# Patient Record
Sex: MALE
[De-identification: names, ages, dates, MRNs, and addresses within clinical notes are randomized per-mention and may not be internally consistent; named-entity substitution may affect disease eponyms.]

---

## 2023-06-23 ENCOUNTER — APPOINTMENT (OUTPATIENT)
Dept: ORTHOPEDIC SURGERY | Facility: CLINIC | Age: 58
End: 2023-06-23
Payer: COMMERCIAL

## 2023-06-23 VITALS — HEIGHT: 71 IN | WEIGHT: 185 LBS | BODY MASS INDEX: 25.9 KG/M2

## 2023-06-23 DIAGNOSIS — M75.42 IMPINGEMENT SYNDROME OF LEFT SHOULDER: ICD-10-CM

## 2023-06-23 PROBLEM — Z00.00 ENCOUNTER FOR PREVENTIVE HEALTH EXAMINATION: Status: ACTIVE | Noted: 2023-06-23

## 2023-06-23 PROCEDURE — 73030 X-RAY EXAM OF SHOULDER: CPT | Mod: LT

## 2023-06-23 PROCEDURE — 99203 OFFICE O/P NEW LOW 30 MIN: CPT

## 2023-06-23 PROCEDURE — 73010 X-RAY EXAM OF SHOULDER BLADE: CPT | Mod: LT

## 2023-06-23 RX ORDER — ATORVASTATIN CALCIUM 80 MG/1
TABLET, FILM COATED ORAL
Refills: 0 | Status: ACTIVE | COMMUNITY

## 2023-06-23 NOTE — HISTORY OF PRESENT ILLNESS
[5] : 5 [4] : 4 [Dull/Aching] : dull/aching [Sharp] : sharp [Intermittent] : intermittent [Nothing helps with pain getting better] : Nothing helps with pain getting better [] : no [FreeTextEntry1] : LT Shoulder [FreeTextEntry5] : Started 1 month ago. Pt denies injury. Denies N/T. Pt has pain in the back of the shoulder

## 2023-06-23 NOTE — IMAGING
[de-identified] : No swelling, no ecchymosis, no shanice deformity\par Tenderness to palpation over greater tuberosity\par No instability or tenderness over AC joint\par Full range of motion with pain\par 4/5 supraspinatus, infraspinatus and subscapularis; there is pain with strength testing\par Positive Contreras test, positive impingement sign\par Speed’s and yergason negative\par Motor and sensory intact distally\par  [Left] : left shoulder [There are no fractures, subluxations or dislocations. No significant abnormalities are seen] : There are no fractures, subluxations or dislocations. No significant abnormalities are seen [Type 2 acromion] : Type 2 acromion

## 2023-06-23 NOTE — ASSESSMENT
[FreeTextEntry1] : Atraumatic, impingement type symptoms in the left shoulder with progressive weakness.  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  Pain is mild today so course of physical therapy and anti-inflammatory medication is advised.  Reassess in 6 weeks if still symptomatic we will try corticosteroid injection possibly MRI.